# Patient Record
Sex: FEMALE | Race: WHITE
[De-identification: names, ages, dates, MRNs, and addresses within clinical notes are randomized per-mention and may not be internally consistent; named-entity substitution may affect disease eponyms.]

---

## 2017-04-19 ENCOUNTER — HOSPITAL ENCOUNTER (OUTPATIENT)
Dept: HOSPITAL 45 - C.MAMM | Age: 67
Discharge: HOME | End: 2017-04-19
Attending: FAMILY MEDICINE
Payer: COMMERCIAL

## 2017-04-19 DIAGNOSIS — Z12.31: Primary | ICD-10-CM

## 2017-04-19 NOTE — MAMMOGRAPHY REPORT
BILATERAL DIGITAL SCREENING MAMMOGRAM TOMOSYNTHESIS WITH CAD: 4/19/2017

CLINICAL HISTORY: Routine screening.  Patient has no complaints.  





TECHNIQUE:  Breast tomosynthesis in addition to standard 2D mammography was performed. Current study
 was also evaluated with a Computer Aided Detection (CAD) system.  



COMPARISON: Comparison is made to exams dated:  7/22/2014 mammogram, 1/21/2014 mammogram, 7/18/2013 
mammogram, 7/12/2013 mammogram, 7/11/2012 mammogram, and 7/11/2011 mammogram - [facility].   



BREAST COMPOSITION:  There are scattered areas of fibroglandular density in both breasts.  



FINDINGS:  No suspicious masses, calcifications, or areas of architectural distortion are noted in e
ither breast. There has been no significant interval change compared to prior exams.  Bilateral pierce
gn appearing calcifications are not significantly changed.  A biopsy marker clip is again noted in t
he right 12:00 breast.





IMPRESSION:  ACR BI-RADS CATEGORY 2: BENIGN

There is no mammographic evidence of malignancy. A 1 year screening mammogram is recommended.  The p
atient will receive written notification of the results.  





Approximately 10% of breast cancers are not detected with mammography. A negative mammographic repor
t should not delay biopsy if a clinically suggestive mass is present.



Karen Ramsey M.D.          

ah/:4/19/2017 13:36:19  



Imaging Technologist: Robb Leon RT(R)(M), [facility]

letter sent: Normal 1/2  

BI-RADS Code: ACR BI-RADS Category 2: Benign

## 2018-02-16 ENCOUNTER — HOSPITAL ENCOUNTER (OUTPATIENT)
Dept: HOSPITAL 45 - X.SURG | Age: 68
Discharge: ANOTHER HOSPITAL | End: 2018-02-16
Attending: SURGERY
Payer: COMMERCIAL

## 2018-02-16 VITALS — OXYGEN SATURATION: 100 % | SYSTOLIC BLOOD PRESSURE: 167 MMHG | DIASTOLIC BLOOD PRESSURE: 76 MMHG | HEART RATE: 62 BPM

## 2018-02-16 VITALS — TEMPERATURE: 97.52 F

## 2018-02-16 VITALS
BODY MASS INDEX: 29.68 KG/M2 | WEIGHT: 163.34 LBS | HEIGHT: 62.01 IN | HEIGHT: 62.01 IN | WEIGHT: 163.34 LBS | BODY MASS INDEX: 29.68 KG/M2

## 2018-02-16 DIAGNOSIS — Z98.890: ICD-10-CM

## 2018-02-16 DIAGNOSIS — Z79.899: ICD-10-CM

## 2018-02-16 DIAGNOSIS — H02.834: ICD-10-CM

## 2018-02-16 DIAGNOSIS — Z90.710: ICD-10-CM

## 2018-02-16 DIAGNOSIS — I25.10: ICD-10-CM

## 2018-02-16 DIAGNOSIS — Z88.2: ICD-10-CM

## 2018-02-16 DIAGNOSIS — Z85.828: ICD-10-CM

## 2018-02-16 DIAGNOSIS — E66.9: ICD-10-CM

## 2018-02-16 DIAGNOSIS — Z82.49: ICD-10-CM

## 2018-02-16 DIAGNOSIS — Z95.1: ICD-10-CM

## 2018-02-16 DIAGNOSIS — E11.9: ICD-10-CM

## 2018-02-16 DIAGNOSIS — I10: ICD-10-CM

## 2018-02-16 DIAGNOSIS — H02.831: Primary | ICD-10-CM

## 2018-02-16 DIAGNOSIS — I25.2: ICD-10-CM

## 2018-02-16 DIAGNOSIS — Z83.3: ICD-10-CM

## 2018-02-16 DIAGNOSIS — Z79.82: ICD-10-CM

## 2018-02-16 NOTE — DISCHARGE INSTRUCTIONS
Discharge Instructions


Date of Service


Feb 16, 2018.





Admission


Reason for Admission:  Bilateral Dermatochalasis





Discharge


Discharge Diagnosis / Problem:  dermatochalasis





Discharge Goals


Goal(s):  Decrease discomfort, Improve function





Activity Recommendations


Activity Limitations:  as noted below


ACTIVITY RECOMMENDATIONS:





__Normal activities





_x_No bending, lifting or straining





__No driving





__Driving allowed when you are off pain medications





_x_Walking permitted





__You should have help at home for ___ days





DRESSINGS:





_x_No dressings required





__Keep dressings dry/in place until first office visit





__Remove dressings ___ and leave dressings off





__Apply ice ___ days





__Remove dressings and reapply garment





_x_Apply antibiotic ointment (prescribed to you at your last office visit) to 

wounds 3-4 times/day for 10 days





BATHING:





__Keep dressings dry





__Sponge bathing permitted





_x_Showering permitted on Sunday





_x_No swimming, hot tubs or soaking in a tub





MEDICATIONS:





Resume previous medications unless instructed otherwise by your surgeon.





_x_Do not use aspirin, Motrin, Advil or Ibuprofen as these may promote 

bleeding.  Please


   use Tylenol.





_x_Prescription(s) provided: pain medication and antibiotic ointment








OTHER INSTRUCTIONS:





__Record drain output 2-3 times per day








SPECIAL CARE INSTRUCTIONS:





*  It is normal to have a mild fever after surgery.  If your temperature is 

higher than 


   101.5 degrees F, please call the office at [phone].





*  Constipation is a typical side effect of pain medication.  An over-the-

counter stool softener


   will help relieve this.





*  Leaking around surgical drains may occur and should not cause concern.  

Sometimes 


   these drains become clogged.  If this happens, remove the bulb and milk the 

clot out of 


   the tube, then replace the bulb.





*  Drainage from wounds after liposuction is normal and should be expected.  

Garments will 


   become soiled.  You should protect furniture and bedding.  This drainage 

should mostly 


   subside within 2-3 days.  Leave garments in place unless instructed to 

remove them.





*  If you have unusual drainage from a wound or are concerned you have an 

infection or have 


   any questions or concerns, please call the office at [phone].








FOLLOW UP VISIT:





If not already scheduled, please call the office, [phone], when you return 

home after surgery 


to schedule an appointment to be seen in __7_ days.








.





Current Hospital Diet


Patient's current hospital diet:





Discharge Diet


Recommended Diet:  Regular Diet





Procedures


Procedures Performed:  


Bilateral Upper Blepharoplasty





Pending Studies


Studies pending at discharge:  no





Medical Emergencies








.


Who to Call and When:





Medical Emergencies:  If at any time you feel your situation is an emergency, 

please call 911 immediately.





.





Non-Emergent Contact


Non-Emergency issues call your:  Primary Care Provider, Surgeon


.








"Provider Documentation" section prepared by Sarai Dunn.








.





VTE Core Measure


Inpt VTE Proph given/why not?:  SCD's





[place] Drug Monitoring Program


Search Results:  no issues identified

## 2018-02-16 NOTE — OPERATIVE REPORT
DATE OF OPERATION:  02/16/2018

 

PREOPERATIVE DIAGNOSIS:  Bilateral upper eyelid dermatochalasis.

 

POSTOPERATIVE DIAGNOSIS:  Same.

 

PROCEDURE:  Bilateral non-cosmetic upper blepharoplasty.

 

SURGEON:  Dr. Catia Barton.

 

ASSISTANT:  Sarai Dunn PA-C.

 

ANESTHESIA:  General.

 

COMPLICATIONS:  None.

 

INDICATION FOR THE PROCEDURE:  The patient is a 67-year-old female who

presented to my office with drooping eyelids and visual field obstruction. 

We discussed performing non-cosmetic upper blepharoplasty and she desired to

proceed.

 

BRIEF DESCRIPTION OF THE PROCEDURE:  Risks, benefits, and alternatives of the

procedure were explained to the patient who agreed and signed consent.  She

was identified and marked in the preoperative holding area.  She was brought

to the operating room where she was positioned supine and placed under

general anesthesia without incident.  Surgical site was prepped and draped

sterilely.  A time-out procedure was performed.  Markings were applied.  A

trapezoidal incision was marked within the supratarsal crease 7 mm above the

ciliary margin and mid pupillary line bilaterally.  The incisional marks were

carried medially toward the medial punctum taking care not to cross the

anatomic landmark and also marked into the lateral canthal area.  Superior

incision was marked about 1 cm below the brow.  Skin was pinched to be sure

adequate closure could be performed.  This was felt to be easily achievable

without causing any lagophthalmos.  Identical markings were placed on the

right hand side.  Corneal protectors and Lacri-Lube were placed.  Lidocaine

1% with epinephrine was used to anesthetize the planned incisions.  I began

with the left side.  A 15-blade scalpel was used to make the incision.  Skin

was dissected off the underlying orbicularis musculature using

electrocautery.  This was performed in a lateral to medial direction.  I

opened the medial fat compartments bilaterally with minimal return of fat and

therefore just cauterized the fat.  Hemostasis was achieved with

electrocautery.  Wound was reapproximated using 6-0 nylon interrupted sutures

taking a bite of orbicularis muscle with each suture.  An identical procedure

was undertaken on the right side.  Following completion of the procedure,

corneal protectors were removed and the eyes were irrigated with balanced

salt solution.  Antibiotic ophthalmic ointment was applied.  The procedure

was tolerated well.  The patient was extubated, and transferred to recovery

in satisfactory condition.

 

Sarai Dunn PA-C, was present and scrubbed throughout the entire procedure

and assisted in retraction during skin dissection and simultaneous wound

closure.

 

 

I attest to the content of the Intraoperative Record and any orders documented therein. Any exception
s are noted below.

## 2018-02-16 NOTE — ANESTHESIA PROGRESS NT - [facility]
Anesthesia Post Op Note


Date & Time


Feb 16, 2018 at 10:01





Vital Signs


Pain Intensity:  0





Vital Signs Past 12 Hours








  Date Time  Temp Pulse Resp B/P (MAP) Pulse Ox O2 Delivery O2 Flow Rate FiO2


 


2/16/18 09:35 36.4 67 18 161/83 (109) 96 Room Air  


 


2/16/18 08:58 36.6 72 16 131/65 98 Room Air  


 


2/16/18 08:56  72 19 131/65 95   


 


2/16/18 08:56  71 19     


 


2/16/18 08:51  71 17     


 


2/16/18 08:51  69 17 140/65 96   


 


2/16/18 08:46  69 16 161/73 98   


 


2/16/18 08:46  70 16     


 


2/16/18 08:41  71 17 149/73 100   


 


2/16/18 08:41  69 17     


 


2/16/18 08:36  67 17 156/68 100   


 


2/16/18 08:36  67 17     


 


2/16/18 08:31  68 18 153/70 100   


 


2/16/18 08:31  68 18     


 


2/16/18 08:26  71 16 149/69 98   


 


2/16/18 08:26  72 16     


 


2/16/18 08:21  80 18     


 


2/16/18 08:21  80 18 131/72 99   


 


2/16/18 08:17    131/78    


 


2/16/18 08:16 36.1 74 20 131/78 99  6 











Notes


Mental Status:  alert / awake / arousable, participated in evaluation


Pt Amnestic to Procedure:  Yes


Nausea / Vomiting:  adequately controlled


Pain:  adequately controlled


Airway Patency, RR, SpO2:  stable & adequate


BP & HR:  stable & adequate


Hydration State:  stable & adequate


Anesthetic Complications:  no major complications apparent

## 2018-02-16 NOTE — HISTORY & PHYSICAL BRIDGE - [facility]
H&P Re-Evaluation


Bridge Note:


I have examined the patient, reviewed the History & Physical and in the 

interval since the performance of the History & Physical I have noted the 

following changes of clinical significance:patient acceptable risk for surgery 

per cardiology; ASA stopped.

## 2018-02-16 NOTE — [facility] POST OPERATIVE BRIEF NOTE
Immediate Operative Summary


Operative Date


Feb 16, 2018.





Pre-Operative Diagnosis





Bilateral Dermatochalasis





Post-Operative Diagnosis





Same





Procedure(s) Performed





Bilateral Upper Blepharoplasty





Surgeon


Dr. Barton





Assistant Surgeon(s)


[place] Dunn PA-C





Estimated Blood Loss


1





Findings


Consistent with Post-Op Diagnosis





Specimens





None





Anesthesia Type


General





Complication(s)


none





Disposition


Disposition:  Recovery Room / PACU